# Patient Record
Sex: MALE | Race: OTHER
[De-identification: names, ages, dates, MRNs, and addresses within clinical notes are randomized per-mention and may not be internally consistent; named-entity substitution may affect disease eponyms.]

---

## 2020-01-04 ENCOUNTER — HOSPITAL ENCOUNTER (EMERGENCY)
Dept: HOSPITAL 72 - EMR | Age: 35
Discharge: HOME | End: 2020-01-04
Payer: COMMERCIAL

## 2020-01-04 VITALS — BODY MASS INDEX: 32.99 KG/M2 | HEIGHT: 65 IN | WEIGHT: 198 LBS

## 2020-01-04 VITALS — DIASTOLIC BLOOD PRESSURE: 90 MMHG | SYSTOLIC BLOOD PRESSURE: 136 MMHG

## 2020-01-04 VITALS — DIASTOLIC BLOOD PRESSURE: 95 MMHG | SYSTOLIC BLOOD PRESSURE: 128 MMHG

## 2020-01-04 DIAGNOSIS — S09.90XA: Primary | ICD-10-CM

## 2020-01-04 DIAGNOSIS — E11.9: ICD-10-CM
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
## 2020-01-04 DIAGNOSIS — I10: ICD-10-CM
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
## 2020-01-04 DIAGNOSIS — R51: ICD-10-CM

## 2020-01-04 PROCEDURE — 96372 THER/PROPH/DIAG INJ SC/IM: CPT

## 2020-01-04 PROCEDURE — 70450 CT HEAD/BRAIN W/O DYE: CPT

## 2020-01-04 PROCEDURE — 99284 EMERGENCY DEPT VISIT MOD MDM: CPT

## 2020-01-04 NOTE — NUR
ED Nurse Note:



Pt walked into ED from home for c/o headache onset 4 days ago. Pt states pain 
is 8/10. Pt reports he had a large pot fall ontop of his head at work 4 days 
ago and has had the intermittent headache since. Pt is aaox4, no cardiac or 
respiratory distress noted, ambulatory with steady gait. Will continue to 
monitor.

## 2020-01-04 NOTE — DIAGNOSTIC IMAGING REPORT
EXAM:

  CT Head Without Intravenous Contrast

 

CLINICAL HISTORY:

  H/A

 

TECHNIQUE:

  Axial computed tomography images of the head/brain without intravenous 

contrast.  CTDI is 62.7 mGy and DLP is 1395 mGy-cm.  One or more of the 

following dose reduction techniques were used: automated exposure control,

 adjustment of the mA and/or kV according to patient size, use of 

iterative reconstruction technique.

 

COMPARISON:

  No relevant prior studies available.

 

FINDINGS:

  Brain:  Unremarkable.  No hemorrhage.  No significant white matter 

disease.  No edema.

  Ventricles:  Unremarkable.  No ventriculomegaly.

  Bones/joints:  Unremarkable.  No acute fracture.

  Soft tissues:  Unremarkable.

  Sinuses:  Unremarkable as visualized.  No acute sinusitis.

  Mastoid air cells:  Unremarkable as visualized.  No mastoid effusion.

 

IMPRESSION:     

  No acute abnormality

## 2020-01-04 NOTE — EMERGENCY ROOM REPORT
History of Present Illness


General


Chief Complaint:  Headache


Source:  Patient





Present Illness


HPI


34-year-old male history of hypertension, diabetes presents with head trauma 4 

days ago, he states a pot fell on his head, at work, ever since then he had a 

gradual onset of headache, no aggravating relieving factors severity is moderate

, he feels on the right side, achy in nature no fevers no chills no change in 

vision no shortness of breath, no focal weakness, headache was not sudden onset 

not the worst headache of his life patient presents for evaluation


Allergies:  


Coded Allergies:  


     No Known Allergies (Unverified , 1/4/20)





Patient History


Past Medical History:  see triage record


Reviewed Nursing Documentation:  PMH: Agreed; PSxH: Agreed





Nursing Documentation-PMH


Past Medical History:  No History, Except For


Hx Hypertension:  Yes


Hx Diabetes:  Yes





Review of Systems


All Other Systems:  negative except mentioned in HPI





Physical Exam





Vital Signs








  Date Time  Temp Pulse Resp B/P (MAP) Pulse Ox O2 Delivery O2 Flow Rate FiO2


 


1/4/20 21:08 98.2 63 14 136/90 (105) 96 Room Air  








Sp02 EP Interpretation:  reviewed, normal


General Appearance:  well appearing, no apparent distress, alert


Head:  normocephalic, atraumatic


Eyes:  bilateral eye PERRL, bilateral eye EOMI


ENT:  uvula midline, moist mucus membranes


Neck:  supple, thyroid normal, supple/symm/no masses


Respiratory:  lungs clear, no respiratory distress, no retraction, no accessory 

muscle use


Cardiovascular #1:  normal peripheral pulses, regular rate, rhythm, no edema, 

no gallop, no murmur


Gastrointestinal:  non tender, soft, no guarding, no rebound


Musculoskeletal:  normal inspection


Neurologic:  alert, CNs III-XII nml as tested, oriented x3, sensory intact, 

cerebellar normal - Finger-nose testing intact, Romberg negative, speech normal

, normal gait, no pronator


Psychiatric:  mood/affect normal


Skin:  no rash, warm/dry





Medical Decision Making


Diagnostic Impression:  


 Primary Impression:  


 Closed head injury


 Qualified Codes:  S09.90XA - Unspecified injury of head, initial encounter


 Additional Impression:  


 Headache


 Qualified Codes:  R51 - Headache


ER Course


34-year-old male, no exam presents with closed head injury, and post headache 

afterwards, differential diagnosis includes headache, postconcussive headache, 

concussion


Patient is insistent on a CT head, CT head is currently negative, patient's 

headache resolved with headache cocktail was given Reglan, Benadryl, Toradol, 

Tylenol and Decadron


Disposition home with return precautions


Follow-up with PCP


CT/MRI/US Diagnostic Results


CT/MRI/US Diagnostic Results :  


   Impression


Final Report


EXAM:


CT Head Without Intravenous Contrast





CLINICAL HISTORY:


H/A





TECHNIQUE:


Axial computed tomography images of the head/brain without intravenous 

contrast. CTDI is 62.7 mGy and DLP is 1395 mGy-cm. One or more of the following 

dose reduction techniques were used: automated exposure control, adjustment of 

the mA and/or kV according to patient size, use of iterative reconstruction 

technique.





COMPARISON:


No relevant prior studies available.





FINDINGS:


Brain: Unremarkable. No hemorrhage. No significant white matter disease. No 

edema.


Ventricles: Unremarkable. No ventriculomegaly.


Bones/joints: Unremarkable. No acute fracture.


Soft tissues: Unremarkable.


Sinuses: Unremarkable as visualized. No acute sinusitis.


Mastoid air cells: Unremarkable as visualized. No mastoid effusion.





IMPRESSION:


No acute abnormality





Radiologist: Julio Adame MD   Electronically Signed: 01/04/20 22:12   Phone

: [phone]


Study ready at 21:55 and initial results transmitted at 22:12





Last Vital Signs








  Date Time  Temp Pulse Resp B/P (MAP) Pulse Ox O2 Delivery O2 Flow Rate FiO2


 


1/4/20 21:15 98.2 63 14 136/90 96 Room Air  








Disposition:  HOME, SELF-CARE


Condition:  Stable


Referrals:  


[facility]





[facility]


Patient Instructions:  Head Injury, Adult, General Headache Without Cause





Additional Instructions:  


The patient was provided with discharge instructions, notified to follow-up 

with a primary care doctor and or specialist in the next 24-48 hours, and to 

return to the ED if they have worsening of their symptoms. 





Please note that this report is being documented using DRAGON technology.


This can lead to erroneous entry secondary to incorrect interpretation by the 

dictating instrument.











José Luis Jacobsen MD Jan 4, 2020 21:23